# Patient Record
Sex: FEMALE | URBAN - METROPOLITAN AREA
[De-identification: names, ages, dates, MRNs, and addresses within clinical notes are randomized per-mention and may not be internally consistent; named-entity substitution may affect disease eponyms.]

---

## 2017-06-15 ENCOUNTER — APPOINTMENT (RX ONLY)
Dept: URBAN - METROPOLITAN AREA CLINIC 127 | Facility: CLINIC | Age: 77
Setting detail: DERMATOLOGY
End: 2017-06-15

## 2017-06-15 DIAGNOSIS — L65.9 NONSCARRING HAIR LOSS, UNSPECIFIED: ICD-10-CM

## 2017-06-15 DIAGNOSIS — L71.8 OTHER ROSACEA: ICD-10-CM

## 2017-06-15 DIAGNOSIS — L81.4 OTHER MELANIN HYPERPIGMENTATION: ICD-10-CM

## 2017-06-15 PROBLEM — H91.90 UNSPECIFIED HEARING LOSS, UNSPECIFIED EAR: Status: ACTIVE | Noted: 2017-06-15

## 2017-06-15 PROBLEM — L29.8 OTHER PRURITUS: Status: ACTIVE | Noted: 2017-06-15

## 2017-06-15 PROBLEM — Z85.3 PERSONAL HISTORY OF MALIGNANT NEOPLASM OF BREAST: Status: ACTIVE | Noted: 2017-06-15

## 2017-06-15 PROBLEM — L55.1 SUNBURN OF SECOND DEGREE: Status: ACTIVE | Noted: 2017-06-15

## 2017-06-15 PROBLEM — L70.0 ACNE VULGARIS: Status: ACTIVE | Noted: 2017-06-15

## 2017-06-15 PROBLEM — M12.9 ARTHROPATHY, UNSPECIFIED: Status: ACTIVE | Noted: 2017-06-15

## 2017-06-15 PROBLEM — J30.1 ALLERGIC RHINITIS DUE TO POLLEN: Status: ACTIVE | Noted: 2017-06-15

## 2017-06-15 PROCEDURE — ? COUNSELING

## 2017-06-15 PROCEDURE — ? TREATMENT REGIMEN

## 2017-06-15 PROCEDURE — ? PRESCRIPTION

## 2017-06-15 PROCEDURE — 99202 OFFICE O/P NEW SF 15 MIN: CPT

## 2017-06-15 RX ORDER — IVERMECTIN 10 MG/G
CREAM TOPICAL
Qty: 1 | Refills: 2 | Status: ERX | COMMUNITY
Start: 2017-06-15

## 2017-06-15 RX ADMIN — IVERMECTIN: 10 CREAM TOPICAL at 17:28

## 2017-06-15 ASSESSMENT — LOCATION ZONE DERM: LOCATION ZONE: FACE

## 2017-06-15 ASSESSMENT — LOCATION SIMPLE DESCRIPTION DERM
LOCATION SIMPLE: RIGHT CHEEK
LOCATION SIMPLE: LEFT CHEEK
LOCATION SIMPLE: RIGHT FOREHEAD

## 2017-06-15 ASSESSMENT — LOCATION DETAILED DESCRIPTION DERM
LOCATION DETAILED: LEFT CENTRAL MALAR CHEEK
LOCATION DETAILED: RIGHT INFERIOR CENTRAL MALAR CHEEK
LOCATION DETAILED: RIGHT SUPERIOR MEDIAL FOREHEAD
LOCATION DETAILED: LEFT INFERIOR CENTRAL MALAR CHEEK

## 2017-06-15 NOTE — PROCEDURE: TREATMENT REGIMEN
Continue Regimen: Biotin
Otc Regimen: Minoxidil
Detail Level: Zone
Initiate Treatment: Nioxin shampoo or t gel

## 2017-06-15 NOTE — HPI: HAIR LOSS
How Did The Hair Loss Occur?: gradual in onset
How Severe Is Your Hair Loss?: moderate
What Hair Products Do You Use?: hair color, ? hair color shampoo
Additional History: Pt states her PCP did blood eval for thyroid and anemia and all were fine. 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

## 2022-06-04 ENCOUNTER — TELEPHONE ENCOUNTER (OUTPATIENT)
Dept: URBAN - METROPOLITAN AREA CLINIC 68 | Facility: CLINIC | Age: 82
End: 2022-06-04

## 2022-06-04 RX ORDER — CIPROFLOXACIN HYDROCHLORIDE 500 MG/1
TABLET, FILM COATED ORAL TWICE DAILY
Qty: 20 | Refills: 0 | OUTPATIENT
Start: 2014-04-29 | End: 2014-05-09

## 2022-06-04 RX ORDER — PAROXETINE HYDROCHLORIDE 25 MG/1
PAXIL CR( 25MG ORAL  DAILY ) INACTIVE -HX ENTRY TABLET, FILM COATED, EXTENDED RELEASE ORAL DAILY
OUTPATIENT
Start: 2019-01-31

## 2022-06-04 RX ORDER — OMEGA-3S/DHA/EPA/FISH OIL/D3 300MG-1000
FISH OIL BURP-LESS( 1200MG ORAL  DAILY ) INACTIVE -HX ENTRY CAPSULE ORAL DAILY
OUTPATIENT
Start: 2014-06-06

## 2022-06-04 RX ORDER — ASPIRIN 325 MG/1
ASPIRIN EC( 325MG ORAL  ONE DAILY ) INACTIVE -HX ENTRY TABLET ORAL
OUTPATIENT
Start: 2014-06-06

## 2022-06-04 RX ORDER — POLYETHYLENE GLYCOL 3350, SODIUM SULFATE, SODIUM CHLORIDE, POTASSIUM CHLORIDE, ASCORBIC ACID, SODIUM ASCORBATE 7.5-2.691G
KIT ORAL AS DIRECTED
Qty: 1 | Refills: 0 | OUTPATIENT
Start: 2012-09-17 | End: 2012-09-18

## 2022-06-04 RX ORDER — COLESEVELAM HYDROCHLORIDE 625 MG/1
TABLET, FILM COATED ORAL
Qty: 30 | Refills: 30 | OUTPATIENT
Start: 2012-09-26 | End: 2014-04-29

## 2022-06-04 RX ORDER — ESOMEPRAZOLE MAGNESIUM 40 MG
NEXIUM( 40MG ORAL  AS NEEDED WHEN TAKING CELEBREX ) INACTIVE -HX ENTRY CAPSULE,DELAYED RELEASE (ENTERIC COATED) ORAL
OUTPATIENT
Start: 2019-05-29

## 2022-06-04 RX ORDER — METRONIDAZOLE 250 MG/1
TABLET ORAL
Qty: 30 | Refills: 0 | OUTPATIENT
Start: 2014-04-29 | End: 2014-05-09

## 2022-06-04 RX ORDER — RISEDRONATE SODIUM 129; 21 MG/1; MG/1
ACTONEL( 150MG ORAL  ONCE A WEEK ) INACTIVE -HX ENTRY TABLET, FILM COATED ORAL
OUTPATIENT
Start: 2014-04-29

## 2022-06-04 RX ORDER — BISMUTH SUBSALICYLATE 525 MG/1
TABLET ORAL
Qty: 10 | Refills: 0 | OUTPATIENT
Start: 2014-04-29 | End: 2014-05-09

## 2022-06-04 RX ORDER — ANASTROZOLE 1 MG/1
ARIMIDEX( 1MG ORAL  DAILY ) INACTIVE -HX ENTRY TABLET ORAL DAILY
OUTPATIENT
Start: 2014-04-29

## 2022-06-04 RX ORDER — LEVOFLOXACIN 250 MG
TABLET ORAL DAILY
Qty: 7 | Refills: 0 | OUTPATIENT
Start: 2014-06-05 | End: 2014-06-12

## 2022-06-05 ENCOUNTER — TELEPHONE ENCOUNTER (OUTPATIENT)
Dept: URBAN - METROPOLITAN AREA CLINIC 68 | Facility: CLINIC | Age: 82
End: 2022-06-05

## 2022-06-05 RX ORDER — BIOTIN 5 MG
BIOTIN 5000( 5MG ORAL  DAILY ) ACTIVE -HX ENTRY CAPSULE ORAL DAILY
Status: ACTIVE | COMMUNITY
Start: 2019-05-24

## 2022-06-05 RX ORDER — DEXLANSOPRAZOLE 60 MG/1
DEXILANT( 60MG ORAL  DAILY ) ACTIVE -HX ENTRY CAPSULE, DELAYED RELEASE ORAL DAILY
Status: ACTIVE | COMMUNITY
Start: 2019-05-29

## 2022-06-05 RX ORDER — UBIDECARENONE 75 MG
VITAMIN B-12( 100MCG ORAL  DAILY ) ACTIVE -HX ENTRY CAPSULE ORAL DAILY
Status: ACTIVE | COMMUNITY
Start: 2019-05-24

## 2022-06-05 RX ORDER — MONTELUKAST SODIUM 10 MG/1
MONTELUKAST SODIUM( 10MG ORAL 1 DAILY ) ACTIVE -HX ENTRY TABLET, FILM COATED ORAL DAILY
Status: ACTIVE | COMMUNITY
Start: 2019-05-24

## 2022-06-05 RX ORDER — CELECOXIB 100 MG
CELEBREX( 100MG ORAL  PRN ) ACTIVE -HX ENTRY CAPSULE ORAL PRN
Status: ACTIVE | COMMUNITY
Start: 2019-05-24

## 2022-06-05 RX ORDER — DIPHENOXYLATE HCL/ATROPINE 2.5-.025MG
LOMOTIL( 2.5-0.025MG ORAL  AS NEEDED ) ACTIVE -HX ENTRY TABLET ORAL AS NEEDED
Status: ACTIVE | COMMUNITY
Start: 2019-05-24

## 2022-06-05 RX ORDER — CYCLOPENTOLATE HYDROCHLORIDE 10 MG/ML
OCUVITE EYE + MULTI(  ORAL 1 DAILY ) ACTIVE -HX ENTRY SOLUTION/ DROPS OPHTHALMIC DAILY
Status: ACTIVE | COMMUNITY
Start: 2019-05-24

## 2022-06-05 RX ORDER — CITALOPRAM 20 MG/1
CELEXA( 20MG ORAL 1 DAILY ) ACTIVE -HX ENTRY TABLET, FILM COATED ORAL DAILY
Status: ACTIVE | COMMUNITY
Start: 2019-05-24

## 2022-06-25 ENCOUNTER — TELEPHONE ENCOUNTER (OUTPATIENT)
Age: 82
End: 2022-06-25

## 2022-06-25 RX ORDER — BISMUTH SUBSALICYLATE 525 MG/1
TABLET ORAL
Qty: 10 | Refills: 0 | OUTPATIENT
Start: 2014-04-29 | End: 2014-05-09

## 2022-06-25 RX ORDER — OMEGA-3S/DHA/EPA/FISH OIL/D3 300MG-1000
FISH OIL BURP-LESS( 1200MG ORAL  DAILY ) INACTIVE -HX ENTRY CAPSULE ORAL DAILY
OUTPATIENT
Start: 2014-06-06

## 2022-06-25 RX ORDER — COLESEVELAM HYDROCHLORIDE 625 MG/1
TABLET, FILM COATED ORAL
Qty: 30 | Refills: 30 | OUTPATIENT
Start: 2012-09-26 | End: 2014-04-29

## 2022-06-25 RX ORDER — LEVOFLOXACIN 250 MG
TABLET ORAL DAILY
Qty: 7 | Refills: 0 | OUTPATIENT
Start: 2014-06-05 | End: 2014-06-12

## 2022-06-25 RX ORDER — ASPIRIN 325 MG/1
ASPIRIN EC( 325MG ORAL  ONE DAILY ) INACTIVE -HX ENTRY TABLET ORAL
OUTPATIENT
Start: 2014-06-06

## 2022-06-25 RX ORDER — CIPROFLOXACIN HCL 500 MG
TABLET ORAL TWICE DAILY
Qty: 20 | Refills: 0 | OUTPATIENT
Start: 2014-04-29 | End: 2014-05-09

## 2022-06-25 RX ORDER — POLYETHYLENE GLYCOL 3350, SODIUM SULFATE, SODIUM CHLORIDE, POTASSIUM CHLORIDE, ASCORBIC ACID, SODIUM ASCORBATE 7.5-2.691G
KIT ORAL AS DIRECTED
Qty: 1 | Refills: 0 | OUTPATIENT
Start: 2012-09-17 | End: 2012-09-18

## 2022-06-25 RX ORDER — METRONIDAZOLE 250 MG/1
TABLET ORAL
Qty: 30 | Refills: 0 | OUTPATIENT
Start: 2014-04-29 | End: 2014-05-09

## 2022-06-25 RX ORDER — RISEDRONATE SODIUM 129; 21 MG/1; MG/1
ACTONEL( 150MG ORAL  ONCE A WEEK ) INACTIVE -HX ENTRY TABLET, FILM COATED ORAL
OUTPATIENT
Start: 2014-04-29

## 2022-06-25 RX ORDER — ANASTROZOLE 1 MG/1
ARIMIDEX( 1MG ORAL  DAILY ) INACTIVE -HX ENTRY TABLET ORAL DAILY
OUTPATIENT
Start: 2014-04-29

## 2022-06-25 RX ORDER — PAROXETINE HYDROCHLORIDE 25 MG/1
PAXIL CR( 25MG ORAL  DAILY ) INACTIVE -HX ENTRY TABLET, FILM COATED, EXTENDED RELEASE ORAL DAILY
OUTPATIENT
Start: 2019-01-31

## 2022-06-25 RX ORDER — ESOMEPRAZOLE MAGNESIUM 40 MG
NEXIUM( 40MG ORAL  AS NEEDED WHEN TAKING CELEBREX ) INACTIVE -HX ENTRY CAPSULE,DELAYED RELEASE (ENTERIC COATED) ORAL
OUTPATIENT
Start: 2019-05-29

## 2022-06-26 ENCOUNTER — TELEPHONE ENCOUNTER (OUTPATIENT)
Age: 82
End: 2022-06-26

## 2022-06-26 RX ORDER — CITALOPRAM HYDROBROMIDE 20 MG
CELEXA( 20MG ORAL 1 DAILY ) ACTIVE -HX ENTRY TABLET ORAL DAILY
Status: ACTIVE | COMMUNITY
Start: 2019-05-24

## 2022-06-26 RX ORDER — CYCLOPENTOLATE HYDROCHLORIDE 10 MG/ML
OCUVITE EYE + MULTI(  ORAL 1 DAILY ) ACTIVE -HX ENTRY SOLUTION/ DROPS OPHTHALMIC DAILY
Status: ACTIVE | COMMUNITY
Start: 2019-05-24

## 2022-06-26 RX ORDER — MONTELUKAST 10 MG/1
MONTELUKAST SODIUM( 10MG ORAL 1 DAILY ) ACTIVE -HX ENTRY TABLET, FILM COATED ORAL DAILY
Status: ACTIVE | COMMUNITY
Start: 2019-05-24

## 2022-06-26 RX ORDER — FLUTICASONE PROPIONATE 50 UG/1
FLONASE( 50MCG/ACT NASAL  AS DIRECTED ) ACTIVE -HX ENTRY SPRAY, METERED NASAL AS DIRECTED
Status: ACTIVE | COMMUNITY
Start: 2019-05-24

## 2022-06-26 RX ORDER — DIPHENOXYLATE HCL/ATROPINE 2.5-.025MG
LOMOTIL( 2.5-0.025MG ORAL  AS NEEDED ) ACTIVE -HX ENTRY TABLET ORAL AS NEEDED
Status: ACTIVE | COMMUNITY
Start: 2019-05-24

## 2022-06-26 RX ORDER — CHOLECALCIFEROL (VITAMIN D3) 25 MCG
VITAMIN D( 1000UNIT ORAL  BID ) ACTIVE -HX ENTRY TABLET ORAL BID
Status: ACTIVE | COMMUNITY
Start: 2019-05-24

## 2022-06-26 RX ORDER — DEXLANSOPRAZOLE 60 MG/1
DEXILANT( 60MG ORAL  DAILY ) ACTIVE -HX ENTRY CAPSULE, DELAYED RELEASE ORAL DAILY
Status: ACTIVE | COMMUNITY
Start: 2019-05-29

## 2022-06-26 RX ORDER — CYANOCOBALAMIN (VITAMIN B-12) 100 MCG
VITAMIN B-12( 100MCG ORAL  DAILY ) ACTIVE -HX ENTRY TABLET ORAL DAILY
Status: ACTIVE | COMMUNITY
Start: 2019-05-24

## 2022-06-26 RX ORDER — ASPIRIN 81 MG/1
LOW-DOSE ASPIRIN( 81MG ORAL 1 DAILY ) ACTIVE -HX ENTRY TABLET, COATED ORAL DAILY
Status: ACTIVE | COMMUNITY
Start: 2019-05-24

## 2022-06-26 RX ORDER — CELECOXIB 100 MG
CELEBREX( 100MG ORAL  PRN ) ACTIVE -HX ENTRY CAPSULE ORAL PRN
Status: ACTIVE | COMMUNITY
Start: 2019-05-24

## 2022-07-13 ENCOUNTER — OFFICE VISIT (OUTPATIENT)
Dept: URBAN - METROPOLITAN AREA CLINIC 68 | Facility: CLINIC | Age: 82
End: 2022-07-13

## 2022-09-25 ENCOUNTER — OFFICE VISIT (OUTPATIENT)
Dept: URBAN - METROPOLITAN AREA CLINIC 68 | Facility: CLINIC | Age: 82
End: 2022-09-25

## 2022-10-19 ENCOUNTER — OFFICE VISIT (OUTPATIENT)
Dept: URBAN - METROPOLITAN AREA CLINIC 68 | Facility: CLINIC | Age: 82
End: 2022-10-19

## 2022-10-19 RX ORDER — MONTELUKAST SODIUM 10 MG/1
MONTELUKAST SODIUM( 10MG ORAL 1 DAILY ) ACTIVE -HX ENTRY TABLET, FILM COATED ORAL DAILY
Status: ACTIVE | COMMUNITY
Start: 2019-05-24

## 2022-10-19 RX ORDER — NYSTATIN 500000 [USP'U]/1
1 TABLET TABLET, FILM COATED ORAL
Qty: 28 TABLET | Refills: 3 | OUTPATIENT
Start: 2022-10-19 | End: 2022-11-15

## 2022-10-19 RX ORDER — MIRABEGRON 50 MG/1
1 TABLET TABLET, FILM COATED, EXTENDED RELEASE ORAL ONCE A DAY
Status: ACTIVE | COMMUNITY

## 2022-10-19 RX ORDER — PANTOPRAZOLE SODIUM 40 MG/1
1 TABLET TABLET, DELAYED RELEASE ORAL ONCE A DAY
Status: ACTIVE | COMMUNITY

## 2022-10-19 RX ORDER — AZELASTINE HYDROCHLORIDE 137 UG/1
1 PUFF IN EACH NOSTRIL SPRAY, METERED NASAL TWICE A DAY
Status: ACTIVE | COMMUNITY

## 2022-10-19 RX ORDER — CYCLOPENTOLATE HYDROCHLORIDE 10 MG/ML
OCUVITE EYE + MULTI(  ORAL 1 DAILY ) ACTIVE -HX ENTRY SOLUTION/ DROPS OPHTHALMIC DAILY
Status: ACTIVE | COMMUNITY
Start: 2019-05-24

## 2022-10-19 RX ORDER — SIMVASTATIN 20 MG
1 TABLET IN THE EVENING TABLET ORAL ONCE A DAY
Status: ACTIVE | COMMUNITY

## 2022-10-19 RX ORDER — ASPIRIN 325 MG/1
1 TABLET TABLET, FILM COATED ORAL ONCE A DAY
Status: ACTIVE | COMMUNITY

## 2022-10-19 RX ORDER — BIOTIN 5 MG
BIOTIN 5000( 5MG ORAL  DAILY ) ACTIVE -HX ENTRY CAPSULE ORAL DAILY
Status: ACTIVE | COMMUNITY
Start: 2019-05-24

## 2022-10-19 RX ORDER — PANTOPRAZOLE SODIUM 40 MG/1
1 TABLET TABLET, DELAYED RELEASE ORAL ONCE A DAY
Qty: 90 TABLET | Refills: 3

## 2022-10-19 RX ORDER — UBIDECARENONE 75 MG
VITAMIN B-12( 100MCG ORAL  DAILY ) ACTIVE -HX ENTRY CAPSULE ORAL DAILY
Status: ACTIVE | COMMUNITY
Start: 2019-05-24

## 2022-10-19 RX ORDER — CITALOPRAM 20 MG/1
1 TABLET TABLET, FILM COATED ORAL ONCE A DAY
Status: ACTIVE | COMMUNITY

## 2022-10-19 RX ORDER — LACTASE 3000 UNIT
1 TABLET WITH FIRST BITE OF DAIRY - CONTAINING FOOD TABLET ORAL ONCE A DAY
Qty: 30 | OUTPATIENT
Start: 2022-10-19 | End: 2022-11-17

## 2022-10-19 NOTE — HPI-MIGRATED HPI
General : 10 y since last colonoscopy  Primary care rec cologaurd ,  Known diverticulitis , last episode 6 yrs ago  Diet no restricted  Hemorrhoids can be swollen  History of GERD  frequent diarrhea ,   Zenkers diverticulum yrs ago, ENT could not do the procedure  referred to specialist, pt deferred  Food gets stuck occasional   Transition to Care : Evaluated for Possible valve disease, possibly a fib , possible aortic regurgitation caridiac evaluation revealed NSR ,  echo and stress test , FU cardiology all is ok

## 2023-12-22 ENCOUNTER — TELEPHONE ENCOUNTER (OUTPATIENT)
Dept: URBAN - METROPOLITAN AREA CLINIC 68 | Facility: CLINIC | Age: 83
End: 2023-12-22

## 2024-01-04 ENCOUNTER — LAB OUTSIDE AN ENCOUNTER (OUTPATIENT)
Dept: URBAN - METROPOLITAN AREA CLINIC 68 | Facility: CLINIC | Age: 84
End: 2024-01-04

## 2024-01-04 ENCOUNTER — OFFICE VISIT (OUTPATIENT)
Dept: URBAN - METROPOLITAN AREA CLINIC 68 | Facility: CLINIC | Age: 84
End: 2024-01-04
Payer: MEDICARE

## 2024-01-04 VITALS
BODY MASS INDEX: 31.01 KG/M2 | HEIGHT: 63 IN | WEIGHT: 175 LBS | HEART RATE: 75 BPM | DIASTOLIC BLOOD PRESSURE: 82 MMHG | TEMPERATURE: 97.7 F | SYSTOLIC BLOOD PRESSURE: 130 MMHG | OXYGEN SATURATION: 96 %

## 2024-01-04 DIAGNOSIS — R19.4 CHANGE IN BOWEL HABITS: ICD-10-CM

## 2024-01-04 DIAGNOSIS — R10.30 LOWER ABDOMINAL PAIN: ICD-10-CM

## 2024-01-04 DIAGNOSIS — R63.4 WEIGHT LOSS: ICD-10-CM

## 2024-01-04 PROBLEM — 14760008: Status: ACTIVE | Noted: 2024-01-04

## 2024-01-04 PROBLEM — 89362005: Status: ACTIVE | Noted: 2024-01-04

## 2024-01-04 PROBLEM — 88111009: Status: ACTIVE | Noted: 2024-01-04

## 2024-01-04 PROCEDURE — 99214 OFFICE O/P EST MOD 30 MIN: CPT

## 2024-01-04 RX ORDER — AZELASTINE HYDROCHLORIDE 137 UG/1
1 PUFF IN EACH NOSTRIL SPRAY, METERED NASAL TWICE A DAY
Status: ACTIVE | COMMUNITY

## 2024-01-04 RX ORDER — SOD SULF/POT CHLORIDE/MAG SULF 1.479 G
AS DIRECTED TABLET ORAL 1
Qty: 1 | Refills: 0 | OUTPATIENT
Start: 2024-01-04 | End: 2024-01-05

## 2024-01-04 RX ORDER — APIXABAN 2.5 MG/1
AS DIRECTED TABLET, FILM COATED ORAL
Status: ACTIVE | COMMUNITY

## 2024-01-04 RX ORDER — CYCLOPENTOLATE HYDROCHLORIDE 10 MG/ML
OCUVITE EYE + MULTI(  ORAL 1 DAILY ) ACTIVE -HX ENTRY SOLUTION/ DROPS OPHTHALMIC DAILY
Status: ACTIVE | COMMUNITY
Start: 2019-05-24

## 2024-01-04 RX ORDER — MONTELUKAST SODIUM 10 MG/1
MONTELUKAST SODIUM( 10MG ORAL 1 DAILY ) ACTIVE -HX ENTRY TABLET, FILM COATED ORAL DAILY
Status: ACTIVE | COMMUNITY
Start: 2019-05-24

## 2024-01-04 RX ORDER — PANTOPRAZOLE SODIUM 40 MG/1
1 TABLET TABLET, DELAYED RELEASE ORAL ONCE A DAY
Qty: 90 TABLET | Refills: 3 | Status: ACTIVE | COMMUNITY

## 2024-01-04 RX ORDER — BIOTIN 5 MG
BIOTIN 5000( 5MG ORAL  DAILY ) ACTIVE -HX ENTRY CAPSULE ORAL DAILY
Status: ACTIVE | COMMUNITY
Start: 2019-05-24

## 2024-01-04 RX ORDER — MIRABEGRON 50 MG/1
1 TABLET TABLET, FILM COATED, EXTENDED RELEASE ORAL ONCE A DAY
Status: ACTIVE | COMMUNITY

## 2024-01-04 RX ORDER — CITALOPRAM 20 MG/1
1 TABLET TABLET, FILM COATED ORAL ONCE A DAY
Status: ACTIVE | COMMUNITY

## 2024-01-04 RX ORDER — SIMVASTATIN 20 MG
1 TABLET IN THE EVENING TABLET ORAL ONCE A DAY
Status: ACTIVE | COMMUNITY

## 2024-01-04 RX ORDER — DIPHENOXYLATE HYDROCHLORIDE AND ATROPINE SULFATE 2.5; .025 MG/5ML; MG/5ML
5 ML AS NEEDED SOLUTION ORAL
Status: ACTIVE | COMMUNITY

## 2024-01-04 RX ORDER — UBIDECARENONE 75 MG
VITAMIN B-12( 100MCG ORAL  DAILY ) ACTIVE -HX ENTRY CAPSULE ORAL DAILY
Status: ACTIVE | COMMUNITY
Start: 2019-05-24

## 2024-01-04 NOTE — HPI-TODAY'S VISIT:
82 y/o F with history of CVA (2/2023), diverticulosis, and breast cancer (s/p XRT 2003), presenting for evaluation of recent change in bowel habits including irregular stool caliber, size, texture, and frequency. She reports she was recently evaluated in the [facility] ED (12/18/23) and did undergo CT Abd/Pelvis w/o findings of diverticulitis or obstruction. She states she is followed by Dr. Campbell and was worked up for A-fib last year but was found negative for detectible arrhythmia. She does take eliquis however, which is managed by her neurologist (Dr. Ye). She was recommended for a colonoscopy last year, but states she was advised not to move forward due to her recent stroke and subsequent anti-coag management. She continues however with irregular bowel movements and some assoc lower abdominal pain. She admits to weight loss over the past year of approx 30lbs although she has been dieting (weight watchers). Patient denies fever, chills, nausea, vomiting, dysphagia, odynophagia, heartburn, or GI bleeding.

## 2024-01-23 ENCOUNTER — OFFICE VISIT (OUTPATIENT)
Dept: URBAN - METROPOLITAN AREA SURGERY CENTER 12 | Facility: SURGERY CENTER | Age: 84
End: 2024-01-23

## 2024-02-09 ENCOUNTER — COLON (OUTPATIENT)
Dept: URBAN - METROPOLITAN AREA SURGERY CENTER 12 | Facility: SURGERY CENTER | Age: 84
End: 2024-02-09

## 2024-03-04 ENCOUNTER — TELEP (OUTPATIENT)
Dept: URBAN - METROPOLITAN AREA TELEHEALTH 1 | Facility: TELEHEALTH | Age: 84
End: 2024-03-04
Payer: MEDICARE

## 2024-03-04 VITALS — HEIGHT: 63 IN

## 2024-03-04 DIAGNOSIS — R10.30 LOWER ABDOMINAL PAIN: ICD-10-CM

## 2024-03-04 DIAGNOSIS — R19.4 CHANGE IN BOWEL HABITS: ICD-10-CM

## 2024-03-04 DIAGNOSIS — K62.5 RECTAL BLEEDING: ICD-10-CM

## 2024-03-04 DIAGNOSIS — R63.4 WEIGHT LOSS: ICD-10-CM

## 2024-03-04 PROCEDURE — 99215 OFFICE O/P EST HI 40 MIN: CPT

## 2024-03-04 RX ORDER — BIOTIN 5 MG
BIOTIN 5000( 5MG ORAL  DAILY ) ACTIVE -HX ENTRY CAPSULE ORAL DAILY
Status: ACTIVE | COMMUNITY
Start: 2019-05-24

## 2024-03-04 RX ORDER — CYCLOPENTOLATE HYDROCHLORIDE 10 MG/ML
OCUVITE EYE + MULTI(  ORAL 1 DAILY ) ACTIVE -HX ENTRY SOLUTION/ DROPS OPHTHALMIC DAILY
Status: ACTIVE | COMMUNITY
Start: 2019-05-24

## 2024-03-04 RX ORDER — UBIDECARENONE 75 MG
VITAMIN B-12( 100MCG ORAL  DAILY ) ACTIVE -HX ENTRY CAPSULE ORAL DAILY
Status: ACTIVE | COMMUNITY
Start: 2019-05-24

## 2024-03-04 RX ORDER — APIXABAN 2.5 MG/1
AS DIRECTED TABLET, FILM COATED ORAL
Status: ACTIVE | COMMUNITY

## 2024-03-04 RX ORDER — PANTOPRAZOLE SODIUM 40 MG/1
1 TABLET TABLET, DELAYED RELEASE ORAL ONCE A DAY
Qty: 90 TABLET | Refills: 3 | Status: ACTIVE | COMMUNITY

## 2024-03-04 RX ORDER — AZELASTINE HYDROCHLORIDE 137 UG/1
1 PUFF IN EACH NOSTRIL SPRAY, METERED NASAL TWICE A DAY
Status: ACTIVE | COMMUNITY

## 2024-03-04 RX ORDER — DIPHENOXYLATE HYDROCHLORIDE AND ATROPINE SULFATE 2.5; .025 MG/5ML; MG/5ML
5 ML AS NEEDED SOLUTION ORAL
Status: ACTIVE | COMMUNITY

## 2024-03-04 RX ORDER — CITALOPRAM 20 MG/1
1 TABLET TABLET, FILM COATED ORAL ONCE A DAY
Status: ACTIVE | COMMUNITY

## 2024-03-04 RX ORDER — MONTELUKAST SODIUM 10 MG/1
MONTELUKAST SODIUM( 10MG ORAL 1 DAILY ) ACTIVE -HX ENTRY TABLET, FILM COATED ORAL DAILY
Status: ACTIVE | COMMUNITY
Start: 2019-05-24

## 2024-03-04 RX ORDER — SIMVASTATIN 20 MG
1 TABLET IN THE EVENING TABLET ORAL ONCE A DAY
Status: ACTIVE | COMMUNITY

## 2024-03-04 RX ORDER — MIRABEGRON 50 MG/1
1 TABLET TABLET, FILM COATED, EXTENDED RELEASE ORAL ONCE A DAY
Status: ACTIVE | COMMUNITY

## 2024-03-04 NOTE — HPI-TODAY'S VISIT:
82 y/o F with history of CVA (2/2023) and possible A-fib (followed by Dr. Campbell), diverticulosis, and breast cancer (s/p XRT 2003), presenting for evaluation of rectal bleeding, onset 1 week ago. She describe recurrent episodes of gross bright red blood in the toilet bowl over a 2 day period with accompanying palpitations. she notes chronic dyspnea and denies worsening from baseline. She states she "feels fine" but expresses significant concern that she "cannot wait" for a colonoscopy in light of recurrent rectal bleeding. She notes continued change in bowl habits from last eval with now worsening lower abdominal pain. She admits to weight loss over the past year of approx 30lbs although she has been dieting (weight watchers). Patient denies fever, chills, nausea, vomiting, dysphagia, odynophagia, heartburn, or GI bleeding.

## 2024-03-11 ENCOUNTER — LAB (OUTPATIENT)
Dept: URBAN - METROPOLITAN AREA CLINIC 68 | Facility: CLINIC | Age: 84
End: 2024-03-11

## 2024-03-11 ENCOUNTER — OV CON (OUTPATIENT)
Dept: URBAN - METROPOLITAN AREA CLINIC 68 | Facility: CLINIC | Age: 84
End: 2024-03-11
Payer: MEDICARE

## 2024-03-11 VITALS
WEIGHT: 175 LBS | OXYGEN SATURATION: 98 % | DIASTOLIC BLOOD PRESSURE: 80 MMHG | SYSTOLIC BLOOD PRESSURE: 130 MMHG | HEIGHT: 63 IN | TEMPERATURE: 97.8 F | BODY MASS INDEX: 31.01 KG/M2 | HEART RATE: 66 BPM

## 2024-03-11 DIAGNOSIS — K62.5 RECTAL BLEEDING: ICD-10-CM

## 2024-03-11 DIAGNOSIS — R63.4 WEIGHT LOSS: ICD-10-CM

## 2024-03-11 DIAGNOSIS — R10.30 LOWER ABDOMINAL PAIN: ICD-10-CM

## 2024-03-11 DIAGNOSIS — R19.4 CHANGE IN BOWEL HABITS: ICD-10-CM

## 2024-03-11 PROBLEM — 12063002: Status: ACTIVE | Noted: 2024-03-04

## 2024-03-11 PROCEDURE — 99214 OFFICE O/P EST MOD 30 MIN: CPT

## 2024-03-11 RX ORDER — BIOTIN 5 MG
BIOTIN 5000( 5MG ORAL  DAILY ) ACTIVE -HX ENTRY CAPSULE ORAL DAILY
Status: ACTIVE | COMMUNITY
Start: 2019-05-24

## 2024-03-11 RX ORDER — CITALOPRAM 20 MG/1
1 TABLET TABLET, FILM COATED ORAL ONCE A DAY
Status: ACTIVE | COMMUNITY

## 2024-03-11 RX ORDER — SIMVASTATIN 20 MG
1 TABLET IN THE EVENING TABLET ORAL ONCE A DAY
Status: ACTIVE | COMMUNITY

## 2024-03-11 RX ORDER — DIPHENOXYLATE HYDROCHLORIDE AND ATROPINE SULFATE 2.5; .025 MG/5ML; MG/5ML
5 ML AS NEEDED SOLUTION ORAL
Status: ACTIVE | COMMUNITY

## 2024-03-11 RX ORDER — MIRABEGRON 50 MG/1
1 TABLET TABLET, FILM COATED, EXTENDED RELEASE ORAL ONCE A DAY
Status: ACTIVE | COMMUNITY

## 2024-03-11 RX ORDER — UBIDECARENONE 75 MG
VITAMIN B-12( 100MCG ORAL  DAILY ) ACTIVE -HX ENTRY CAPSULE ORAL DAILY
Status: ACTIVE | COMMUNITY
Start: 2019-05-24

## 2024-03-11 RX ORDER — APIXABAN 2.5 MG/1
AS DIRECTED TABLET, FILM COATED ORAL
Status: ACTIVE | COMMUNITY

## 2024-03-11 RX ORDER — CYCLOPENTOLATE HYDROCHLORIDE 10 MG/ML
OCUVITE EYE + MULTI(  ORAL 1 DAILY ) ACTIVE -HX ENTRY SOLUTION/ DROPS OPHTHALMIC DAILY
Status: ACTIVE | COMMUNITY
Start: 2019-05-24

## 2024-03-11 RX ORDER — PANTOPRAZOLE SODIUM 40 MG/1
1 TABLET TABLET, DELAYED RELEASE ORAL ONCE A DAY
Qty: 90 TABLET | Refills: 3 | Status: ACTIVE | COMMUNITY

## 2024-03-11 RX ORDER — SOD SULF/POT CHLORIDE/MAG SULF 1.479 G
12 TABLETS THE MORNING BEFORE THE PROCEDURE (AT 10AM) AND 12 TABLETS THE EVENING BEFORE THE PROCEDURE (AT 6PM) TABLET ORAL TWICE A DAY
Qty: 24 | Refills: 0 | OUTPATIENT
Start: 2024-03-11 | End: 2024-03-12

## 2024-03-11 RX ORDER — MONTELUKAST SODIUM 10 MG/1
MONTELUKAST SODIUM( 10MG ORAL 1 DAILY ) ACTIVE -HX ENTRY TABLET, FILM COATED ORAL DAILY
Status: ACTIVE | COMMUNITY
Start: 2019-05-24

## 2024-03-11 RX ORDER — AZELASTINE HYDROCHLORIDE 137 UG/1
1 PUFF IN EACH NOSTRIL SPRAY, METERED NASAL TWICE A DAY
Status: ACTIVE | COMMUNITY

## 2024-03-11 NOTE — HPI-TODAY'S VISIT:
85 y/o F with history of CVA (2/2023) and possible A-fib (followed by Dr. Campbell), diverticulosis, and breast cancer (s/p XRT 2003), presenting for follow up of rectal bleeding s/p recent [facility] ED visit (3/4/2024). She reports previous rectal bleeding with copious bright red blood in toilet bowl for 2 days with accompanying palpitations and dyspnea. She notes bleeding had resolved, and she continued feeling fatigued. Patient was in the ED and found stable.  Today, she describes worsening intermittent diarrheal episodes since 1/2024, sometimes up to 16 BMs daily.  She admits to accompanying change in stool caliber and occasional pellet-like stools with mild lower abdominal pain.  She did have a CT (3/4/2024) with findings of mild ileus.  She otherwise notes she would like to move forward with colonoscopy in light of her persistent change in bowel habits and rectal bleeding. Patient denies nausea, vomiting, dysphagia, odynophagia, heartburn, abdominal pain, diarrhea, constipation, melena, or unintentional weight loss

## 2024-03-12 ENCOUNTER — OV EP (OUTPATIENT)
Dept: URBAN - METROPOLITAN AREA CLINIC 68 | Facility: CLINIC | Age: 84
End: 2024-03-12

## 2024-03-26 ENCOUNTER — COLON (OUTPATIENT)
Dept: URBAN - METROPOLITAN AREA SURGERY CENTER 12 | Facility: SURGERY CENTER | Age: 84
End: 2024-03-26

## 2024-04-30 ENCOUNTER — COLON (OUTPATIENT)
Dept: URBAN - METROPOLITAN AREA SURGERY CENTER 12 | Facility: SURGERY CENTER | Age: 84
End: 2024-04-30
Payer: MEDICARE

## 2024-04-30 DIAGNOSIS — K63.89 OTHER SPECIFIED DISEASES OF INTESTINE: ICD-10-CM

## 2024-04-30 DIAGNOSIS — K57.30 DIVERTICULOSIS OF LARGE INTESTINE WITHOUT PERFORATION OR ABSCESS WITHOUT BLEEDING: ICD-10-CM

## 2024-04-30 DIAGNOSIS — Q43.8 TORTUOUS COLON: ICD-10-CM

## 2024-04-30 DIAGNOSIS — K62.89 OTHER SPECIFIED DISEASES OF ANUS AND RECTUM: ICD-10-CM

## 2024-04-30 DIAGNOSIS — K62.6 ULCER OF ANUS AND RECTUM: ICD-10-CM

## 2024-04-30 PROCEDURE — 45380 COLONOSCOPY AND BIOPSY: CPT | Performed by: SPECIALIST

## 2024-04-30 RX ORDER — CYCLOPENTOLATE HYDROCHLORIDE 10 MG/ML
OCUVITE EYE + MULTI(  ORAL 1 DAILY ) ACTIVE -HX ENTRY SOLUTION/ DROPS OPHTHALMIC DAILY
Status: ACTIVE | COMMUNITY
Start: 2019-05-24

## 2024-04-30 RX ORDER — MESALAMINE 1.2 G/1
2 TABLETS TABLET, DELAYED RELEASE ORAL
Qty: 120 TABLET | Refills: 11 | OUTPATIENT
Start: 2024-04-30 | End: 2025-04-25

## 2024-04-30 RX ORDER — PANTOPRAZOLE SODIUM 40 MG/1
1 TABLET TABLET, DELAYED RELEASE ORAL ONCE A DAY
Qty: 90 TABLET | Refills: 3 | Status: ACTIVE | COMMUNITY

## 2024-04-30 RX ORDER — SIMVASTATIN 20 MG
1 TABLET IN THE EVENING TABLET ORAL ONCE A DAY
Status: ACTIVE | COMMUNITY

## 2024-04-30 RX ORDER — UBIDECARENONE 75 MG
VITAMIN B-12( 100MCG ORAL  DAILY ) ACTIVE -HX ENTRY CAPSULE ORAL DAILY
Status: ACTIVE | COMMUNITY
Start: 2019-05-24

## 2024-04-30 RX ORDER — DIPHENOXYLATE HYDROCHLORIDE AND ATROPINE SULFATE 2.5; .025 MG/5ML; MG/5ML
5 ML AS NEEDED SOLUTION ORAL
Status: ACTIVE | COMMUNITY

## 2024-04-30 RX ORDER — MONTELUKAST SODIUM 10 MG/1
MONTELUKAST SODIUM( 10MG ORAL 1 DAILY ) ACTIVE -HX ENTRY TABLET, FILM COATED ORAL DAILY
Status: ACTIVE | COMMUNITY
Start: 2019-05-24

## 2024-04-30 RX ORDER — APIXABAN 2.5 MG/1
AS DIRECTED TABLET, FILM COATED ORAL
Status: ACTIVE | COMMUNITY

## 2024-04-30 RX ORDER — BIOTIN 5 MG
BIOTIN 5000( 5MG ORAL  DAILY ) ACTIVE -HX ENTRY CAPSULE ORAL DAILY
Status: ACTIVE | COMMUNITY
Start: 2019-05-24

## 2024-04-30 RX ORDER — AZELASTINE HYDROCHLORIDE 137 UG/1
1 PUFF IN EACH NOSTRIL SPRAY, METERED NASAL TWICE A DAY
Status: ACTIVE | COMMUNITY

## 2024-04-30 RX ORDER — MIRABEGRON 50 MG/1
1 TABLET TABLET, FILM COATED, EXTENDED RELEASE ORAL ONCE A DAY
Status: ACTIVE | COMMUNITY

## 2024-04-30 RX ORDER — CITALOPRAM 20 MG/1
1 TABLET TABLET, FILM COATED ORAL ONCE A DAY
Status: ACTIVE | COMMUNITY

## 2024-05-16 ENCOUNTER — DASHBOARD ENCOUNTERS (OUTPATIENT)
Age: 84
End: 2024-05-16

## 2024-05-16 ENCOUNTER — LAB OUTSIDE AN ENCOUNTER (OUTPATIENT)
Dept: URBAN - METROPOLITAN AREA CLINIC 68 | Facility: CLINIC | Age: 84
End: 2024-05-16

## 2024-05-16 ENCOUNTER — OFFICE VISIT (OUTPATIENT)
Dept: URBAN - METROPOLITAN AREA CLINIC 68 | Facility: CLINIC | Age: 84
End: 2024-05-16
Payer: MEDICARE

## 2024-05-16 VITALS
DIASTOLIC BLOOD PRESSURE: 80 MMHG | WEIGHT: 175 LBS | SYSTOLIC BLOOD PRESSURE: 120 MMHG | HEART RATE: 89 BPM | BODY MASS INDEX: 31.01 KG/M2 | OXYGEN SATURATION: 93 % | HEIGHT: 63 IN

## 2024-05-16 DIAGNOSIS — K52.9 COLITIS: ICD-10-CM

## 2024-05-16 DIAGNOSIS — R19.4 CHANGE IN BOWEL HABITS: ICD-10-CM

## 2024-05-16 DIAGNOSIS — K50.111 CROHN'S DISEASE OF COLON WITH RECTAL BLEEDING: ICD-10-CM

## 2024-05-16 DIAGNOSIS — K62.5 RECTAL BLEEDING: ICD-10-CM

## 2024-05-16 DIAGNOSIS — R10.30 LOWER ABDOMINAL PAIN: ICD-10-CM

## 2024-05-16 PROBLEM — 50440006: Status: ACTIVE | Noted: 2024-05-16

## 2024-05-16 PROCEDURE — 99214 OFFICE O/P EST MOD 30 MIN: CPT | Performed by: SPECIALIST

## 2024-05-16 RX ORDER — AZELASTINE HYDROCHLORIDE 137 UG/1
1 PUFF IN EACH NOSTRIL SPRAY, METERED NASAL TWICE A DAY
Status: ACTIVE | COMMUNITY

## 2024-05-16 RX ORDER — SIMVASTATIN 20 MG
1 TABLET IN THE EVENING TABLET ORAL ONCE A DAY
Status: ACTIVE | COMMUNITY

## 2024-05-16 RX ORDER — CYCLOPENTOLATE HYDROCHLORIDE 10 MG/ML
OCUVITE EYE + MULTI(  ORAL 1 DAILY ) ACTIVE -HX ENTRY SOLUTION/ DROPS OPHTHALMIC DAILY
Status: ACTIVE | COMMUNITY
Start: 2019-05-24

## 2024-05-16 RX ORDER — BUDESONIDE 3 MG/1
3 CAPSULES CAPSULE ORAL ONCE A DAY
Qty: 84 CAPSULE | Refills: 2 | OUTPATIENT
Start: 2024-05-16

## 2024-05-16 RX ORDER — PANTOPRAZOLE SODIUM 40 MG/1
1 TABLET TABLET, DELAYED RELEASE ORAL ONCE A DAY
Qty: 90 TABLET | Refills: 3 | Status: ACTIVE | COMMUNITY

## 2024-05-16 RX ORDER — MONTELUKAST SODIUM 10 MG/1
MONTELUKAST SODIUM( 10MG ORAL 1 DAILY ) ACTIVE -HX ENTRY TABLET, FILM COATED ORAL DAILY
Status: ACTIVE | COMMUNITY
Start: 2019-05-24

## 2024-05-16 RX ORDER — MIRABEGRON 50 MG/1
1 TABLET TABLET, FILM COATED, EXTENDED RELEASE ORAL ONCE A DAY
Status: ACTIVE | COMMUNITY

## 2024-05-16 RX ORDER — APIXABAN 2.5 MG/1
AS DIRECTED TABLET, FILM COATED ORAL
Status: ACTIVE | COMMUNITY

## 2024-05-16 RX ORDER — MESALAMINE 1.2 G/1
2 TABLETS TABLET, DELAYED RELEASE ORAL
Qty: 120 TABLET | Refills: 11 | Status: ACTIVE | COMMUNITY
Start: 2024-04-30 | End: 2025-04-25

## 2024-05-16 RX ORDER — CITALOPRAM 20 MG/1
1 TABLET TABLET, FILM COATED ORAL ONCE A DAY
Status: ACTIVE | COMMUNITY

## 2024-05-16 RX ORDER — MESALAMINE 1.2 G/1
1 TABLET TABLET, DELAYED RELEASE ORAL
Qty: 180 TABLET | Refills: 3 | OUTPATIENT
Start: 2024-04-30 | End: 2025-05-11

## 2024-05-16 RX ORDER — UBIDECARENONE 75 MG
VITAMIN B-12( 100MCG ORAL  DAILY ) ACTIVE -HX ENTRY CAPSULE ORAL DAILY
Status: ACTIVE | COMMUNITY
Start: 2019-05-24

## 2024-05-16 RX ORDER — BIOTIN 5 MG
BIOTIN 5000( 5MG ORAL  DAILY ) ACTIVE -HX ENTRY CAPSULE ORAL DAILY
Status: ACTIVE | COMMUNITY
Start: 2019-05-24

## 2024-05-16 RX ORDER — DIPHENOXYLATE HYDROCHLORIDE AND ATROPINE SULFATE 2.5; .025 MG/5ML; MG/5ML
5 ML AS NEEDED SOLUTION ORAL
Status: ACTIVE | COMMUNITY

## 2024-06-11 ENCOUNTER — LAB OUTSIDE AN ENCOUNTER (OUTPATIENT)
Dept: URBAN - METROPOLITAN AREA CLINIC 68 | Facility: CLINIC | Age: 84
End: 2024-06-11

## 2024-06-12 LAB
ABSOLUTE BASOPHILS: 99
ABSOLUTE EOSINOPHILS: 341
ABSOLUTE LYMPHOCYTES: 1505
ABSOLUTE MONOCYTES: 675
ABSOLUTE NEUTROPHILS: 4480
BASOPHILS: 1.4
EOSINOPHILS: 4.8
HEMATOCRIT: 38.1
HEMOGLOBIN: 12.1
LYMPHOCYTES: 21.2
MCH: 29.2
MCHC: 31.8
MCV: 91.8
MONOCYTES: 9.5
MPV: 9.8
NEUTROPHILS: 63.1
PLATELET COUNT: 185
RDW: 14
RED BLOOD CELL COUNT: 4.15
WHITE BLOOD CELL COUNT: 7.1

## 2024-06-13 ENCOUNTER — OFFICE VISIT (OUTPATIENT)
Dept: URBAN - METROPOLITAN AREA CLINIC 68 | Facility: CLINIC | Age: 84
End: 2024-06-13
Payer: MEDICARE

## 2024-06-13 VITALS
BODY MASS INDEX: 30.65 KG/M2 | OXYGEN SATURATION: 101 % | DIASTOLIC BLOOD PRESSURE: 82 MMHG | HEIGHT: 63 IN | SYSTOLIC BLOOD PRESSURE: 124 MMHG | WEIGHT: 173 LBS | HEART RATE: 97 BPM

## 2024-06-13 DIAGNOSIS — R10.30 LOWER ABDOMINAL PAIN: ICD-10-CM

## 2024-06-13 DIAGNOSIS — R19.4 CHANGE IN BOWEL HABITS: ICD-10-CM

## 2024-06-13 DIAGNOSIS — I35.9 AORTIC VALVE DISEASE: ICD-10-CM

## 2024-06-13 DIAGNOSIS — K52.9 CHRONIC COLITIS: ICD-10-CM

## 2024-06-13 PROCEDURE — 99214 OFFICE O/P EST MOD 30 MIN: CPT | Performed by: SPECIALIST

## 2024-06-13 RX ORDER — SIMVASTATIN 20 MG
1 TABLET IN THE EVENING TABLET ORAL ONCE A DAY
Status: ACTIVE | COMMUNITY

## 2024-06-13 RX ORDER — BUDESONIDE 3 MG/1
3 CAPSULES CAPSULE ORAL ONCE A DAY
OUTPATIENT
Start: 2024-05-16

## 2024-06-13 RX ORDER — AZELASTINE HYDROCHLORIDE 137 UG/1
1 PUFF IN EACH NOSTRIL SPRAY, METERED NASAL TWICE A DAY
Status: ACTIVE | COMMUNITY

## 2024-06-13 RX ORDER — MESALAMINE 1.2 G/1
1 TABLET TABLET, DELAYED RELEASE ORAL
OUTPATIENT
Start: 2024-04-30

## 2024-06-13 RX ORDER — BIOTIN 5 MG
BIOTIN 5000( 5MG ORAL  DAILY ) ACTIVE -HX ENTRY CAPSULE ORAL DAILY
Status: ACTIVE | COMMUNITY
Start: 2019-05-24

## 2024-06-13 RX ORDER — CITALOPRAM 20 MG/1
1 TABLET TABLET, FILM COATED ORAL ONCE A DAY
Status: ACTIVE | COMMUNITY

## 2024-06-13 RX ORDER — APIXABAN 2.5 MG/1
AS DIRECTED TABLET, FILM COATED ORAL
Status: ACTIVE | COMMUNITY

## 2024-06-13 RX ORDER — BUDESONIDE 3 MG/1
3 CAPSULES CAPSULE ORAL ONCE A DAY
Qty: 84 CAPSULE | Refills: 2 | Status: ACTIVE | COMMUNITY
Start: 2024-05-16

## 2024-06-13 RX ORDER — MESALAMINE 1.2 G/1
1 TABLET TABLET, DELAYED RELEASE ORAL
Qty: 180 TABLET | Refills: 3 | Status: ACTIVE | COMMUNITY
Start: 2024-04-30 | End: 2025-05-11

## 2024-06-13 NOTE — PHYSICAL EXAM CONSTITUTIONAL:
well developed, well nourished , in no acute distress , ambulating without difficulty , normal communication ability [id number]/yes

## 2024-06-13 NOTE — HPI-TODAY'S VISIT:
6/13  SP MRE negative SB involvement, POS colitis  SP colon colitis ? Crohns vs UC  recent SOB is getting it at night  cardiac evaluation POS a fib  Hx CVA in the past  Pt is forgetful, poor hearing  Lialda 1 daily and 2 at night  Eating well including milk products   IMP Ulcerative colitis?  Better on therapy Budesonide  need to inc to 2 bid Lialda review in 4 weeks  still occas diarrhea mostly formed      5/16/24 Had severe diarrhea improve to 2 or 3 BMS semi formed or shredded  NO bleeding  on Lialda 2  twice daily  now 1 twice daily nausea better  poor appetite   SP COLONOSCOPY LIMITED by tortuosity and active colitis, bx POS for Crohn's disease, new diagnosis  IMP  CROHN DISEASE  Better on Lialda  only 1 bid  trial budesonide in this 85 yo to control symptom including possible effect of other intestine MRI ENTEROGRAM        85 y/o F with history of CVA (2/2023) and possible A-fib (followed by Dr. Campbell), diverticulosis, and breast cancer (s/p XRT 2003), presenting for follow up of rectal bleeding s/p recent [facility] ED visit (3/4/2024). She reports previous rectal bleeding with copious bright red blood in toilet bowl for 2 days with accompanying palpitations and dyspnea. She notes bleeding had resolved, and she continued feeling fatigued. Patient was in the ED and found stable.  Today, she describes worsening intermittent diarrheal episodes since 1/2024, sometimes up to 16 BM's daily.  She admits to accompanying change in stool caliber and occasional pellet-like stools with mild lower abdominal pain.  She did have a CT (3/4/2024) with findings of mild ileus.  She otherwise notes she would like to move forward with xcolonoscopy in light of her persistent change in bowel habits and rectal bleeding. Patient denies nausea, vomiting, dysphagia, odynophagia, heartburn, abdominal pain, diarrhea, constipation, melena, or unintentional weight loss

## 2024-07-11 ENCOUNTER — OFFICE VISIT (OUTPATIENT)
Dept: URBAN - METROPOLITAN AREA CLINIC 68 | Facility: CLINIC | Age: 84
End: 2024-07-11
Payer: MEDICARE

## 2024-07-11 VITALS
OXYGEN SATURATION: 97 % | SYSTOLIC BLOOD PRESSURE: 124 MMHG | DIASTOLIC BLOOD PRESSURE: 80 MMHG | BODY MASS INDEX: 28.88 KG/M2 | HEART RATE: 90 BPM | HEIGHT: 63 IN | WEIGHT: 163 LBS

## 2024-07-11 DIAGNOSIS — K50.119 CROHN'S DISEASE OF COLON WITH COMPLICATION: ICD-10-CM

## 2024-07-11 DIAGNOSIS — I35.9 AORTIC VALVE DISEASE: ICD-10-CM

## 2024-07-11 DIAGNOSIS — R10.30 LOWER ABDOMINAL PAIN: ICD-10-CM

## 2024-07-11 DIAGNOSIS — K52.9 CHRONIC COLITIS: ICD-10-CM

## 2024-07-11 DIAGNOSIS — R19.4 CHANGE IN BOWEL HABITS: ICD-10-CM

## 2024-07-11 PROBLEM — 50440006: Status: ACTIVE | Noted: 2024-07-11

## 2024-07-11 PROCEDURE — 99215 OFFICE O/P EST HI 40 MIN: CPT | Performed by: SPECIALIST

## 2024-07-11 RX ORDER — PREDNISONE 10 MG/1
2 TABLETS TABLET ORAL ONCE A DAY
Qty: 60 TABLET | Refills: 1 | OUTPATIENT
Start: 2024-07-11 | End: 2024-09-09

## 2024-07-11 RX ORDER — AZELASTINE HYDROCHLORIDE 137 UG/1
1 PUFF IN EACH NOSTRIL SPRAY, METERED NASAL TWICE A DAY
Status: ACTIVE | COMMUNITY

## 2024-07-11 RX ORDER — BUDESONIDE 3 MG/1
3 CAPSULES CAPSULE ORAL ONCE A DAY
Status: ACTIVE | COMMUNITY
Start: 2024-05-16

## 2024-07-11 RX ORDER — VEDOLIZUMAB 300 MG/5ML
AS DIRECTED INJECTION, POWDER, LYOPHILIZED, FOR SOLUTION INTRAVENOUS
OUTPATIENT
Start: 2024-07-11

## 2024-07-11 RX ORDER — CITALOPRAM 20 MG/1
1 TABLET TABLET, FILM COATED ORAL ONCE A DAY
Status: ACTIVE | COMMUNITY

## 2024-07-11 RX ORDER — MESALAMINE 1.2 G/1
2 TABLETS WITH A MEAL TABLET, DELAYED RELEASE ORAL
OUTPATIENT

## 2024-07-11 RX ORDER — APIXABAN 2.5 MG/1
AS DIRECTED TABLET, FILM COATED ORAL
Status: ACTIVE | COMMUNITY

## 2024-07-11 RX ORDER — MESALAMINE 1.2 G/1
1 TABLET TABLET, DELAYED RELEASE ORAL
Status: DISCONTINUED | COMMUNITY
Start: 2024-04-30

## 2024-07-11 RX ORDER — BIOTIN 5 MG
BIOTIN 5000( 5MG ORAL  DAILY ) ACTIVE -HX ENTRY CAPSULE ORAL DAILY
Status: ACTIVE | COMMUNITY
Start: 2019-05-24

## 2024-07-11 RX ORDER — SIMVASTATIN 20 MG
1 TABLET IN THE EVENING TABLET ORAL ONCE A DAY
Status: ACTIVE | COMMUNITY

## 2024-07-11 NOTE — HPI-TODAY'S VISIT:
7/11  stools are more frequent,  Lialda 2 twice a day  budesonide 3 daily  need prednisone and entyvio start    Plan Entyvio start week 0, 4  and8 the every 8 iv     6/13  SP MRE negative SB involvement, POS colitis  SP colon colitis ? Crohns vs UC  recent SOB is getting it at night  cardiac evaluation POS a fib  Hx CVA in the past  Pt is forgetful, poor hearing  Lialda 1 daily and 2 at night  Eating well including milk products   IMP Ulcerative colitis?  Better on therapy Budesonide  need to inc to 2 bid Lialda review in 4 weeks  still occas diarrhea mostly formed      5/16/24 Had severe diarrhea improve to 2 or 3 BMS semi formed or shredded  NO bleeding  on Lialda 2  twice daily  now 1 twice daily nausea better  poor appetite   SP COLONOSCOPY LIMITED by tortuosity and active colitis, bx POS for Crohn's disease, new diagnosis  IMP  CROHN DISEASE  Better on Lialda  only 1 bid  trial budesonide in this 83 yo to control symptom including possible effect of other intestine MRI ENTEROGRAM        83 y/o F with history of CVA (2/2023) and possible A-fib (followed by Dr. Campbell), diverticulosis, and breast cancer (s/p XRT 2003), presenting for follow up of rectal bleeding s/p recent [facility] ED visit (3/4/2024). She reports previous rectal bleeding with copious bright red blood in toilet bowl for 2 days with accompanying palpitations and dyspnea. She notes bleeding had resolved, and she continued feeling fatigued. Patient was in the ED and found stable.  Today, she describes worsening intermittent diarrheal episodes since 1/2024, sometimes up to 16 BM's daily.  She admits to accompanying change in stool caliber and occasional pellet-like stools with mild lower abdominal pain.  She did have a CT (3/4/2024) with findings of mild ileus.  She otherwise notes she would like to move forward with xcolonoscopy in light of her persistent change in bowel habits and rectal bleeding. Patient denies nausea, vomiting, dysphagia, odynophagia, heartburn, abdominal pain, diarrhea, constipation, melena, or unintentional weight loss
No

## 2024-08-06 ENCOUNTER — LAB OUTSIDE AN ENCOUNTER (OUTPATIENT)
Dept: URBAN - METROPOLITAN AREA CLINIC 68 | Facility: CLINIC | Age: 84
End: 2024-08-06

## 2024-08-11 LAB
ALBUMIN: 3.8
ALKALINE PHOSPHATASE: 63
ALT (SGPT): 11
AST (SGOT): 15
BILIRUBIN, TOTAL: 0.5
BUN/CREATININE RATIO: 17
BUN: 14
CALCIUM: 9.1
CARBON DIOXIDE, TOTAL: 26
CHLORIDE: 104
CREATININE: 0.83
EGFR: 69
GLOBULIN, TOTAL: 2
GLUCOSE: 95
HEP B SURFACE AB, QUAL: NON REACTIVE
POTASSIUM: 3.6
PROTEIN, TOTAL: 5.8
SODIUM: 142